# Patient Record
Sex: FEMALE | Race: AMERICAN INDIAN OR ALASKA NATIVE | URBAN - METROPOLITAN AREA
[De-identification: names, ages, dates, MRNs, and addresses within clinical notes are randomized per-mention and may not be internally consistent; named-entity substitution may affect disease eponyms.]

---

## 2021-08-31 ENCOUNTER — OFFICE VISIT (OUTPATIENT)
Dept: URBAN - METROPOLITAN AREA CLINIC 64 | Facility: CLINIC | Age: 76
End: 2021-08-31
Payer: MEDICARE

## 2021-08-31 PROCEDURE — 99204 OFFICE O/P NEW MOD 45 MIN: CPT | Performed by: OPTOMETRIST

## 2021-08-31 PROCEDURE — 92134 CPTRZ OPH DX IMG PST SGM RTA: CPT | Performed by: OPTOMETRIST

## 2021-08-31 ASSESSMENT — INTRAOCULAR PRESSURE
OD: 14
OS: 11

## 2021-08-31 ASSESSMENT — VISUAL ACUITY
OS: 20/20
OD: 20/25

## 2021-08-31 NOTE — IMPRESSION/PLAN
Impression: Puckering of macula, right eye: H35.371. Plan: Mild. May affect BCVA OD after cat sx. Continue to monitor.

## 2021-08-31 NOTE — IMPRESSION/PLAN
Impression: Age-related nuclear cataract, bilateral: H25.13. Plan: Referred from [facility] for cataract surgery OU. Discussed surgery vs. observation. She would like to proceed with surgery. Caution with premiums as there is an ERM OD. Recommend AM OU.

## 2021-10-05 ENCOUNTER — PRE-OPERATIVE VISIT (OUTPATIENT)
Dept: URBAN - METROPOLITAN AREA CLINIC 64 | Facility: CLINIC | Age: 76
End: 2021-10-05
Payer: MEDICARE

## 2021-10-05 DIAGNOSIS — H43.811 VITREOUS DEGENERATION, RIGHT EYE: ICD-10-CM

## 2021-10-05 DIAGNOSIS — H52.223 REGULAR ASTIGMATISM, BILATERAL: ICD-10-CM

## 2021-10-05 DIAGNOSIS — H35.371 PUCKERING OF MACULA, RIGHT EYE: ICD-10-CM

## 2021-10-05 DIAGNOSIS — I63.9 CEREBRAL INFARCTION, UNSPECIFIED: ICD-10-CM

## 2021-10-05 DIAGNOSIS — H25.812 COMBINED FORMS OF AGE-RELATED CATARACT, LEFT EYE: ICD-10-CM

## 2021-10-05 PROCEDURE — 99204 OFFICE O/P NEW MOD 45 MIN: CPT | Performed by: STUDENT IN AN ORGANIZED HEALTH CARE EDUCATION/TRAINING PROGRAM

## 2021-10-05 ASSESSMENT — INTRAOCULAR PRESSURE
OD: 12
OS: 11

## 2021-10-05 ASSESSMENT — VISUAL ACUITY
OD: 20/20
OS: 20/20

## 2021-10-05 NOTE — IMPRESSION/PLAN
Impression: Age-related nuclear cataract, bilateral: H25.13. Plan: Chart has been reviewed prior to seeing the patient and additional testing is necessary including A-scan/Lens Biometry. Discussed cataracts, treatment options, and surgical risks/benefits with patient including bleeding, infection, capsular break, glaucoma, corneal clouding. Patient understands there are tradeoffs to each intraocular lens choice and glasses may still be necessary after surgery. Pt understands that multifocal intraocular lenses have side effects including but not limited to Halos/Glare/Difficulty in Dim Lighting and Intermediate vision. The patient is bothered by the symptoms of his/her cataract which is not correctable with a change in glasses and their ADL's are impaired. Patient elects surgical treatment. Recommend ORA. Recommend Dexycu + Moxifloxacin (PF) Intracameral Injection. Lens Recommendation: Monofocal with LRI OD and Monofocal only OS Technology: OK for ORA and [employer] Aim OD: -0.25. Aim OS: -0.25. [employer]: OD Notes: ERM OD

## 2021-10-05 NOTE — IMPRESSION/PLAN
Impression: Cerebral infarction, unspecified: I63.9. Plan: Unsure of visual field defects/ Patient declines vision changes after the event. May need 30-2 in the future if symptoms persist or worsen.

## 2021-10-05 NOTE — IMPRESSION/PLAN
Impression: Puckering of macula, right eye: H35.371. Plan: Discussed diagnosis with patient. Discussed this may be a limiting factor for visual acuity. If cataract sugery is performed and vision does not imrove, patient will be referred to Retina services for consultation.

## 2021-10-05 NOTE — IMPRESSION/PLAN
Impression: Vitreous degeneration, right eye: H43.811. Plan: No family history, prior peripheral retinal disease, or other risk factors evident. Warning signs of retinal tear and detachment discussed with patient. Advised patient to return to clinic STAT if any change in symptoms.

## 2021-10-05 NOTE — IMPRESSION/PLAN
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 Plan: Recommend AM in OD (OS minimal astigmatism) for best vision.

## 2021-11-02 ENCOUNTER — ADULT PHYSICAL (OUTPATIENT)
Dept: URBAN - METROPOLITAN AREA CLINIC 64 | Facility: CLINIC | Age: 76
End: 2021-11-02
Payer: MEDICARE

## 2021-11-02 DIAGNOSIS — H25.13 AGE-RELATED NUCLEAR CATARACT, BILATERAL: ICD-10-CM

## 2021-11-02 DIAGNOSIS — Z01.818 ENCOUNTER FOR OTHER PREPROCEDURAL EXAMINATION: Primary | ICD-10-CM

## 2021-11-02 PROCEDURE — 99203 OFFICE O/P NEW LOW 30 MIN: CPT | Performed by: NURSE PRACTITIONER

## 2021-11-10 ENCOUNTER — SURGERY (OUTPATIENT)
Dept: URBAN - METROPOLITAN AREA SURGERY 42 | Facility: SURGERY | Age: 76
End: 2021-11-10
Payer: MEDICARE

## 2021-11-10 PROCEDURE — 66984 XCAPSL CTRC RMVL W/O ECP: CPT | Performed by: STUDENT IN AN ORGANIZED HEALTH CARE EDUCATION/TRAINING PROGRAM

## 2021-11-11 ENCOUNTER — POST-OPERATIVE VISIT (OUTPATIENT)
Dept: URBAN - METROPOLITAN AREA CLINIC 64 | Facility: CLINIC | Age: 76
End: 2021-11-11
Payer: MEDICARE

## 2021-11-11 DIAGNOSIS — Z48.810 ENCOUNTER FOR SURGICAL AFTERCARE FOLLOWING SURGERY ON A SENSE ORGAN: Primary | ICD-10-CM

## 2021-11-11 PROCEDURE — 99024 POSTOP FOLLOW-UP VISIT: CPT | Performed by: OPTOMETRIST

## 2021-11-11 ASSESSMENT — INTRAOCULAR PRESSURE: OD: 10

## 2021-11-11 NOTE — IMPRESSION/PLAN
Impression: S/P Cataract Extraction by phacoemulsification with IOL placement OD - 1 Day. Encounter for surgical aftercare following surgery on a sense organ  Z48.810. Plan: S/P PE with IOL OD - doing well. Mild ERM OD may affect BCVA. RTC 1 wk.

## 2021-11-18 ENCOUNTER — POST-OPERATIVE VISIT (OUTPATIENT)
Dept: URBAN - METROPOLITAN AREA CLINIC 64 | Facility: CLINIC | Age: 76
End: 2021-11-18
Payer: MEDICARE

## 2021-11-18 PROCEDURE — 99024 POSTOP FOLLOW-UP VISIT: CPT | Performed by: OPTOMETRIST

## 2021-11-18 ASSESSMENT — INTRAOCULAR PRESSURE
OD: 14
OS: 14

## 2021-11-18 ASSESSMENT — VISUAL ACUITY: OD: 20/25

## 2021-11-18 NOTE — IMPRESSION/PLAN
Impression: S/P Cataract Extraction by phacoemulsification with IOL placement OD - 8 Days. Encounter for surgical aftercare following surgery on a sense organ  Z48.810. Plan: S/P PE with IOL OD - doing well. Mild ERM OD may affect BCVA. Ok for sx OS.

## 2021-12-01 ENCOUNTER — SURGERY (OUTPATIENT)
Dept: URBAN - METROPOLITAN AREA SURGERY 42 | Facility: SURGERY | Age: 76
End: 2021-12-01
Payer: MEDICARE

## 2021-12-01 PROCEDURE — 66984 XCAPSL CTRC RMVL W/O ECP: CPT | Performed by: STUDENT IN AN ORGANIZED HEALTH CARE EDUCATION/TRAINING PROGRAM

## 2021-12-02 ENCOUNTER — POST-OPERATIVE VISIT (OUTPATIENT)
Dept: URBAN - METROPOLITAN AREA CLINIC 64 | Facility: CLINIC | Age: 76
End: 2021-12-02
Payer: MEDICARE

## 2021-12-02 PROCEDURE — 99024 POSTOP FOLLOW-UP VISIT: CPT | Performed by: OPTOMETRIST

## 2021-12-02 ASSESSMENT — INTRAOCULAR PRESSURE
OS: 13
OD: 13

## 2021-12-02 NOTE — IMPRESSION/PLAN
Impression: S/P Cataract Extraction by phacoemulsification with IOL placement OS - 1 Day. Presence of intraocular lens  Z96.1. Plan: S/P PE with IOL OS - doing well. Same sx recently done OD as well. Mild ERM OD may affect BCVA. RTC 1 wk. She will want a new glasses Rx in 1 month.

## 2021-12-08 ENCOUNTER — POST-OPERATIVE VISIT (OUTPATIENT)
Dept: URBAN - METROPOLITAN AREA CLINIC 64 | Facility: CLINIC | Age: 76
End: 2021-12-08
Payer: MEDICARE

## 2021-12-08 DIAGNOSIS — Z96.1 PRESENCE OF INTRAOCULAR LENS: Primary | ICD-10-CM

## 2021-12-08 PROCEDURE — 99024 POSTOP FOLLOW-UP VISIT: CPT | Performed by: OPTOMETRIST

## 2021-12-08 ASSESSMENT — INTRAOCULAR PRESSURE
OD: 14
OS: 14

## 2021-12-08 NOTE — IMPRESSION/PLAN
Impression: S/P Cataract Extraction by phacoemulsification with IOL placement OS - 7 Days. Presence of intraocular lens  Z96.1. Plan: S/P PE with IOL OS - doing well. Same sx recently done OD as well. Mild ERM OD. [facility] OU. Discussed glasses Rx. She will use +2.00 OTC for near work and nothing for far. RTC 1 year [facility].